# Patient Record
(demographics unavailable — no encounter records)

---

## 2024-12-01 NOTE — HISTORY OF PRESENT ILLNESS
[FreeTextEntry1] : Patient is a 68 year  y/o presenting for an annual visit.  She is feeling well and is without complaints. She denies vaginal itching, odor and discharge. Patient reports urinary urgency, frequency and dysuria. LMP 29 yrs ago, denies VB since that times.  PMH: DM2 HTN, hyperchilesterolema   - TOPx13, NSVDx5  mammogram 3 years ago,  DEXA - 2 years ago  colonoscopy - 4-5 years ago  PSH: denies   [postmenopausal] : postmenopausal [N] : Patient does not use contraception [Y] : Positive pregnancy history [Mammogramdate] : 2021 [BoneDensityDate] : 2022 [ColonoscopyDate] : 2020 [[facility] [[facility]xFul[facility]] : 5 [PGHxPremature] : 0 [PGHxAbortions] : 13 [[facility]Living] : 5 [PGHxABInduced] : 13 [Diffuse] : diffuse [Urination] : urination

## 2024-12-01 NOTE — HISTORY OF PRESENT ILLNESS
[FreeTextEntry1] : Patient is a 68 year  y/o presenting for an annual visit.  She is feeling well and is without complaints. She denies vaginal itching, odor and discharge. Patient reports urinary urgency, frequency and dysuria. LMP 29 yrs ago, denies VB since that times.  PMH: DM2 HTN, hyperchilesterolema   - TOPx13, NSVDx5  mammogram 3 years ago,  DEXA - 2 years ago  colonoscopy - 4-5 years ago  PSH: denies   [postmenopausal] : postmenopausal [N] : Patient does not use contraception [Y] : Positive pregnancy history [Mammogramdate] : 2021 [BoneDensityDate] : 2022 [ColonoscopyDate] : 2020 [[facility]xTotal] : 18 [[facility] CenterxFulerm] : 5 [PGHxPremature] : 0 [PGHxAbortions] : 13 [[facility]xLiving] : 5 [PGHxABInduced] : 13 [Diffuse] : diffuse [Urination] : urination

## 2024-12-01 NOTE — PLAN
[FreeTextEntry1] : 68 year  y/o P5 presenting for annual exam -f/u pap and GC/CT -Requisition given for mammogram -Contraception: none -PHQ-2  depression screen done -uac sent, RX sent for broad spectrum antibiotics, recommended azo as well  -f/u PRN

## 2024-12-01 NOTE — PHYSICAL EXAM
[Chaperone Present] : A chaperone was present in the examining room during all aspects of the physical examination [[id number]] : A chaperone was present during the pelvic exam. [FreeTextEntry2] : Mimi [Appropriately responsive] : appropriately responsive [Alert] : alert [No Acute Distress] : no acute distress [No Lymphadenopathy] : no lymphadenopathy [Soft] : soft [Non-tender] : non-tender [Non-distended] : non-distended [No HSM] : No HSM [No Lesions] : no lesions [No Mass] : no mass [Oriented x3] : oriented x3 [Examination Of The Breasts] : a normal appearance [No Masses] : no breast masses were palpable [Labia Majora] : normal [Labia Minora] : normal [Normal] : normal [Uterine Adnexae] : normal

## 2024-12-01 NOTE — PHYSICAL EXAM
[Chaperone Present] : A chaperone was present in the examining room during all aspects of the physical examination [[id number]] : A chaperone was present during the pelvic exam. [FreeTextEntry2] : Mimi [Appropriately responsive] : appropriately responsive [Alert] : alert [No Acute Distress] : no acute distress [No Lymphadenopathy] : no lymphadenopathy [Soft] : soft [Non-tender] : non-tender [Non-distended] : non-distended [No HSM] : No HSM [No Lesions] : no lesions [No Mass] : no mass [Oriented x3] : oriented x3 [Examination Of The Breasts] : a normal appearance [No Masses] : no breast masses were palpable [Labia Majora] : normal [Labia Minora] : normal [Normal] : normal [Uterine Adnexae] : normal